# Patient Record
Sex: MALE | Race: WHITE | NOT HISPANIC OR LATINO
[De-identification: names, ages, dates, MRNs, and addresses within clinical notes are randomized per-mention and may not be internally consistent; named-entity substitution may affect disease eponyms.]

---

## 2020-06-02 ENCOUNTER — TRANSCRIPTION ENCOUNTER (OUTPATIENT)
Age: 80
End: 2020-06-02

## 2021-04-02 ENCOUNTER — RESULT REVIEW (OUTPATIENT)
Age: 81
End: 2021-04-02

## 2022-12-05 PROBLEM — Z00.00 ENCOUNTER FOR PREVENTIVE HEALTH EXAMINATION: Status: ACTIVE | Noted: 2022-12-05

## 2022-12-06 ENCOUNTER — APPOINTMENT (OUTPATIENT)
Dept: CARDIOTHORACIC SURGERY | Facility: CLINIC | Age: 82
End: 2022-12-06

## 2022-12-06 VITALS
HEART RATE: 56 BPM | OXYGEN SATURATION: 96 % | HEIGHT: 73 IN | DIASTOLIC BLOOD PRESSURE: 66 MMHG | WEIGHT: 166.5 LBS | BODY MASS INDEX: 22.07 KG/M2 | RESPIRATION RATE: 18 BRPM | SYSTOLIC BLOOD PRESSURE: 107 MMHG | TEMPERATURE: 98.1 F

## 2022-12-06 DIAGNOSIS — Z87.891 PERSONAL HISTORY OF NICOTINE DEPENDENCE: ICD-10-CM

## 2022-12-06 DIAGNOSIS — Z86.73 PERSONAL HISTORY OF TRANSIENT ISCHEMIC ATTACK (TIA), AND CEREBRAL INFARCTION W/OUT RESIDUAL DEFICITS: ICD-10-CM

## 2022-12-06 DIAGNOSIS — I10 ESSENTIAL (PRIMARY) HYPERTENSION: ICD-10-CM

## 2022-12-06 DIAGNOSIS — Z78.9 OTHER SPECIFIED HEALTH STATUS: ICD-10-CM

## 2022-12-06 DIAGNOSIS — I25.10 ATHEROSCLEROTIC HEART DISEASE OF NATIVE CORONARY ARTERY W/OUT ANGINA PECTORIS: ICD-10-CM

## 2022-12-06 DIAGNOSIS — E78.5 HYPERLIPIDEMIA, UNSPECIFIED: ICD-10-CM

## 2022-12-06 DIAGNOSIS — I65.29 OCCLUSION AND STENOSIS OF UNSPECIFIED CAROTID ARTERY: ICD-10-CM

## 2022-12-06 DIAGNOSIS — J44.9 CHRONIC OBSTRUCTIVE PULMONARY DISEASE, UNSPECIFIED: ICD-10-CM

## 2022-12-06 DIAGNOSIS — I71.40 ABDOMINAL AORTIC ANEURYSM, WITHOUT RUPTURE, UNSPECIFIED: ICD-10-CM

## 2022-12-06 DIAGNOSIS — Z86.39 PERSONAL HISTORY OF OTHER ENDOCRINE, NUTRITIONAL AND METABOLIC DISEASE: ICD-10-CM

## 2022-12-06 DIAGNOSIS — I73.9 PERIPHERAL VASCULAR DISEASE, UNSPECIFIED: ICD-10-CM

## 2022-12-06 DIAGNOSIS — Z86.79 PERSONAL HISTORY OF OTHER DISEASES OF THE CIRCULATORY SYSTEM: ICD-10-CM

## 2022-12-06 PROCEDURE — 99205 OFFICE O/P NEW HI 60 MIN: CPT

## 2022-12-06 RX ORDER — ATORVASTATIN CALCIUM 40 MG/1
40 TABLET, FILM COATED ORAL
Refills: 0 | Status: ACTIVE | COMMUNITY

## 2022-12-06 RX ORDER — TAMSULOSIN HYDROCHLORIDE 0.4 MG/1
0.4 CAPSULE ORAL
Refills: 0 | Status: ACTIVE | COMMUNITY

## 2022-12-06 RX ORDER — CLOPIDOGREL BISULFATE 75 MG/1
75 TABLET, FILM COATED ORAL
Refills: 0 | Status: ACTIVE | COMMUNITY

## 2022-12-08 PROBLEM — Z86.73 HISTORY OF CEREBROVASCULAR ACCIDENT: Status: RESOLVED | Noted: 2022-12-08 | Resolved: 2022-12-08

## 2022-12-08 NOTE — ASSESSMENT
[FreeTextEntry1] : Patient presents to the office today with his wife and daughter. Independent review of Cardiac Catherization performed and reviewed with patient and his family.\par \par Briefly, this is an 82 year old male with severe peripheral vascular disease, who has undergone the first part of a two stage procedure with Dr. 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 The occlusion was discovered when the patient reported unsteady gait, dizziness and weakness. He had a cardiac catheterization for clearance prior to the second portion of the procedure.\par \par The Cardiac Catherization revealed multi vessel coronary disease (100% stenosis in the RCA; proximal left main artery stenosis of 50% and distal LM 60-70% stenosis), thus coronary artery bypass grafting is recommended. The distal LM stenosis needs to be addressed, however, due to the patient's severe carotid disease, history of stroke and current symptoms, he is placed at a significantly increased risk of stroke during coronary artery bypass graft. Fortunately, he is not currently having symptoms of his coronary disease and denies chest pain, shortness of breath or lower extremity edema. \par \par The right coronary system is chronically occluded and filled by collaterals. This does not have the risk of an acute myocardial infarction. The severity of left main disease, while intervention is recommended, is not of primary concern given the severity of carotid disease. Following the carotid procedure, we will re-evaluate his clinical status prior to proceeding with surgical intervention of the coronary arteries. At that time, medical management may also be considered.\par \par I personally spoke with Dr. Vera regarding patient's multivessel coronary disease and carotid disease. It was discussed that the patient's symptoms are more likely due to patient's carotid disease than multivessel coronary disease. Therefore, I believe it is appropriate to complete the carotid surgical intervention prior to coronary artery bypass grafting. We discussed that a combined carotid intervention and coronary artery bypass graft is not recommended since the carotid procedure would be a re-operation. Careful consideration of optimal blood pressure management during carotid procedure is imperative. Carotid procedure at a tertiary care center with cardiac surgery back up should be considered.\par \par The planned procedure, hospital stay and recovery was discussed in detail. All risks, benefits and alternatives were discussed at length with the patient. All questions addressed. The patient fully understood and would like to proceed with surgical intervention as discussed.\par \par PLAN:\par - cleared for vascular surgery\par - Return to office after carotid intervention for evaluation of cardiac surgery\par \par \par I, Deneen Shah NP and SHIRAZ HALL, am scribing for and in the presence of Dr. Mcclain the following sections HISTORY OF PRESENT ILLNESS, PAST MEDICAL/FAMILY/SOCIAL HISTORY; REVIEW OF SYSTEMS; VITAL SIGNS; PHYSICAL EXAM; DISPOSITION.\par \par "I personally performed the services described in the documentation, reviewed the documentation recorded by the scribe in my presence and accurately and completely records my words and actions."

## 2022-12-08 NOTE — DATA REVIEWED
[FreeTextEntry1] : Cardiac Catherization at [facility] 12/5/22:\par LM- prox 50% eccentric lesion and distal 60-70% lesion with CSA 4.4cm2 by IVUS.\par LAD- mod diffuse luminal irregularities with goof distal target vessel\par LCx- mod diffuse luminal irregularities with good distal target vessel\par RCA- ostial 100% occlusion, distal vessel fills via L-> R collaterals\par Coronary circulation is right dominant.\par \par \par \par \par \par Carotid Duplex 11/29/22:\par IMPRESSION:\par Right:\par The common carotid artery is occluded.\par Patent internal carotid endarterectomy with plaque formation.\par There is evidence of retrograde, low resistant flow in the external carotid artery and the vessel is patent.\par The vertebral artery is widely patent with antegrade flow and elevated velocities.\par Stents visualized supra and sub clavicular subclavian artery are patent with good flow.\par \par Left:\par There is mild intimal thickening in the common carotid with calcifications.\par There is moderate plaque in the internal carotid consistent with a 50-69% stenosis.\par There is severe external carotid stenosis.\par The vertebral artery is wildly patent with antegrade flow and elevated velocities.\par \par \par \par \par \par Echocardiogram at [facility] on 10/28/22:\par Left ventricular ejection fraction 66%.\par Mild concentric left ventricle hypertrophy.\par Left atrium: mildly dilated.\par Aortic valve: Trileaflet with minimal focal thickening and has normal cusp separation.\par Mitral valve: There is mitral leaflet calcification. Moderate mitral annular calcification. Trace mitral regurgitation.\par Tricuspid valve: Trace tricuspid regurgitation. Mild tricuspid annular calcification.\par Pulmonary artery: The tricuspid regurgitant velocity is 2.43m/s, and with an assumed right atrial pressure of 5mmHg, the estimated right ventricular systolic pressure is normal at 28.6mmHg. The pulmonary artery is normal in size.

## 2022-12-08 NOTE — CONSULT LETTER
[Dear  ___] : Dear  [unfilled], [Consult Letter:] : I had the pleasure of evaluating your patient, [unfilled]. [Please see my note below.] : Please see my note below. [Consult Closing:] : Thank you very much for allowing me to participate in the care of this patient.  If you have any questions, please do not hesitate to contact me. [Sincerely,] : Sincerely, [( Thank you for referring [unfilled] for consultation for _____ )] : Thank you for referring [unfilled] for consultation for [unfilled] [FreeTextEntry2] : Pasquale Nelson  [FreeTextEntry3] : Ronald Mcclain MD\par , Cardiothoracic Surgery\par , Cardiovascular and Thoracic Surgery\par [facility], [facility], [place], operated by [facility]\par [facility]\par [facility]\par [address] [place] [address]\[place] [address]\par Tel. [phone]\par Fax [phone]  [DrJailyn  ___] : Dr. CASTILLO [DrJailyn ___] : Dr. CASTILLO

## 2022-12-08 NOTE — HISTORY OF PRESENT ILLNESS
[FreeTextEntry1] : Abdoul Chaudhari is a 82 year old male who presents for consultation for coronary artery disease.\par \par Past medical history includes hyperlipidemia, hypertension, coronary atherosclerosis, carotid atherosclerosis, abdominal aortic aneurysm (s/p open repair April 2021), peripheral vascular disease (s/p right common and profunda femoral endarterectomy [facility] 08/2017), COPD, hernia repair, CVA, right CEA ([facility] 2005), [place] bilaterally.\par \par Patient reports that he has been experiencing dizziness, balance problems, and weakness in his legs. He is currently being followed by vascular (Dr. Vera) and has had the first stage of a two stage bypass of the carotid, which was a right subclavian angioplasty and stent in November 2022. He reports was undergoing a cardiac evaluation prior to second stage, which revealed multi vessel disease on a Cardiac Catherization at [facility] on 12/5/22.\par \par He currently lives at home with his wife.\par Ambulates independently, has an unsteady gait.\par \par Cardiology: Dr. Nelson\par Vascular: Dr. Mary Vera\par \par Patient is a poor historian.

## 2022-12-08 NOTE — REVIEW OF SYSTEMS
[Loss Of Hearing] : hearing loss [Dizziness] : dizziness [Difficulty Walking] : difficulty walking [Easy Bruising] : a tendency for easy bruising [Negative] : Endocrine [Fever] : no fever [Chills] : no chills [Feeling Poorly] : not feeling poorly [Feeling Tired] : not feeling tired [Chest Pain] : no chest pain [Palpitations] : no palpitations [Lower Ext Edema] : no extremity edema [Cough] : no cough [SOB on Exertion] : no shortness of breath during exertion [Fainting] : no fainting [FreeTextEntry4] : Bilateral hearing aids [FreeTextEntry9] : unsteady gait

## 2022-12-08 NOTE — PHYSICAL EXAM
[General Appearance - Alert] : alert [General Appearance - In No Acute Distress] : in no acute distress [Sclera] : the sclera and conjunctiva were normal [Outer Ear] : the ears and nose were normal in appearance [Neck Appearance] : the appearance of the neck was normal [Neck Cervical Mass (___cm)] : no neck mass was observed [Jugular Venous Distention Increased] : there was no jugular-venous distention [] : no respiratory distress [Respiration, Rhythm And Depth] : normal respiratory rhythm and effort [Auscultation Breath Sounds / Voice Sounds] : lungs were clear to auscultation bilaterally [Heart Rate And Rhythm] : heart rate was normal and rhythm regular [Heart Sounds] : normal S1 and S2 [Murmurs] : no murmurs [Left Carotid Bruit] : left carotid bruit heard [2+] : left 2+ [Bowel Sounds] : normal bowel sounds [Abdomen Soft] : soft [Cervical Lymph Nodes Enlarged Anterior Bilaterally] : anterior cervical [Supraclavicular Lymph Nodes Enlarged Bilaterally] : supraclavicular [Skin Color & Pigmentation] : normal skin color and pigmentation [No Focal Deficits] : no focal deficits [Oriented To Time, Place, And Person] : oriented to person, place, and time [Impaired Insight] : insight and judgment were intact [Bruit] : no bruit heard [Varicose Veins Of The Right Leg] : the patient has no varicose veins of the right leg [Varicose Veins Of The Left Leg] : the patient has no varicose veins of the left leg [FreeTextEntry2] : no edema [FreeTextEntry1] : bruising left hand